# Patient Record
Sex: MALE | URBAN - METROPOLITAN AREA
[De-identification: names, ages, dates, MRNs, and addresses within clinical notes are randomized per-mention and may not be internally consistent; named-entity substitution may affect disease eponyms.]

---

## 2022-01-23 ENCOUNTER — HOSPITAL ENCOUNTER (EMERGENCY)
Age: 37
Discharge: LEFT BEFORE DISCHARGE | End: 2022-01-23
Admitting: EMERGENCY MEDICINE

## 2022-01-23 VITALS
BODY MASS INDEX: 29.55 KG/M2 | WEIGHT: 195 LBS | TEMPERATURE: 99.4 F | OXYGEN SATURATION: 100 % | DIASTOLIC BLOOD PRESSURE: 82 MMHG | SYSTOLIC BLOOD PRESSURE: 145 MMHG | HEIGHT: 68 IN | HEART RATE: 103 BPM | RESPIRATION RATE: 18 BRPM

## 2022-01-23 DIAGNOSIS — K02.9 PAIN DUE TO DENTAL CARIES: Primary | ICD-10-CM

## 2022-01-23 DIAGNOSIS — Z76.5 DRUG-SEEKING BEHAVIOR: ICD-10-CM

## 2022-01-23 PROCEDURE — 99282 EMERGENCY DEPT VISIT SF MDM: CPT

## 2022-01-23 NOTE — ED PROVIDER NOTES
EMERGENCY DEPARTMENT HISTORY AND PHYSICAL EXAM      Date: 1/23/2022  Patient Name: Mynor Reilly    History of Presenting Illness     Chief Complaint   Patient presents with    Dental Pain       History Provided By: Patient    HPI: Mynor Reilly, 29 y.o. male with no past medical history   who presents to the ED with cc of dental pain for 5 days. Pt states that he has been taking amoxil 500mg that his wife had and narco that he paid for from his friend. Pt states he would like to continue taking the amoxil for the infection to save money and wanted to know if I could prescribe norco for pain for him since the NSAIDS and tylenol are not working. I explained to the patient that we do not prescribe narcotics for tooth pain. Pt became very upset and stormed out of the ED. There are no other complaints, changes, or physical findings at this time. PCP: No primary care provider on file. No current facility-administered medications on file prior to encounter. No current outpatient medications on file prior to encounter. Past History     Past Medical History:  No past medical history on file. Past Surgical History:  No past surgical history on file. Family History:  No family history on file. Social History:  Social History     Tobacco Use    Smoking status: Not on file    Smokeless tobacco: Not on file   Substance Use Topics    Alcohol use: Not on file    Drug use: Not on file       Allergies:  No Known Allergies      Review of Systems     Review of Systems   Constitutional: Negative. HENT: Positive for dental problem. Eyes: Negative. Respiratory: Negative. Cardiovascular: Negative. Gastrointestinal: Negative. Endocrine: Negative. Genitourinary: Negative. Musculoskeletal: Negative. Skin: Negative. Allergic/Immunologic: Negative. Neurological: Negative. Hematological: Negative. Psychiatric/Behavioral: Negative.         Physical Exam     Physical Exam  Vitals and nursing note reviewed. Constitutional:       Appearance: Normal appearance. He is normal weight. HENT:      Head: Normocephalic and atraumatic. Left Ear: Tympanic membrane normal.      Mouth/Throat:      Mouth: Mucous membranes are moist.      Pharynx: Oropharynx is clear. Comments: + dental caries  Pulmonary:      Breath sounds: Normal breath sounds. Neurological:      General: No focal deficit present. Mental Status: He is alert and oriented to person, place, and time. Mental status is at baseline. Psychiatric:         Mood and Affect: Mood normal.         Behavior: Behavior normal.         Thought Content: Thought content normal.         Judgment: Judgment normal.         Lab and Diagnostic Study Results     Labs -   No results found for this or any previous visit (from the past 12 hour(s)). Radiologic Studies -   @lastxrresult@  CT Results  (Last 48 hours)    None        CXR Results  (Last 48 hours)    None            Medical Decision Making   - I am the first provider for this patient. - I reviewed the vital signs, available nursing notes, past medical history, past surgical history, family history and social history. - Initial assessment performed. The patients presenting problems have been discussed, and they are in agreement with the care plan formulated and outlined with them. I have encouraged them to ask questions as they arise throughout their visit. Vital Signs-Reviewed the patient's vital signs.   Patient Vitals for the past 12 hrs:   Temp Pulse Resp BP SpO2   01/23/22 1120 99.4 °F (37.4 °C) (!) 103 18 (!) 145/82 100 %       Records Reviewed: Nursing Notes    The patient presents  with a differential diagnosis of dental pain    ED Course:          Provider Notes (Medical Decision Making):     MDM  Number of Diagnoses or Management Options  Risk of Complications, Morbidity, and/or Mortality  Presenting problems: minimal  Management options: minimal    Patient Progress  Patient progress: stable         Procedures   Medical Decision Makingedical Decision Making  Performed by: Sherre Fothergill Page, PA  PROCEDURES:  Procedures       Disposition   Disposition: Condition stable    AMA    DISCHARGE PLAN:  1. There are no discharge medications for this patient. 2.   Follow-up Information     Follow up With Specialties Details Why [address] [place] [address] Dentistry Schedule an appointment as soon as possible for a visit in 1 day  [address] [place] [address]  [phone]        3. Return to ED if worse   4. There are no discharge medications for this patient. Diagnosis     Clinical Impression:   1. Pain due to dental caries    2. Drug-seeking behavior        Attestations:    Sherre Fothergill Page, PA    Please note that this dictation was completed with in[employer], the computer voice recognition software. Quite often unanticipated grammatical, syntax, homophones, and other interpretive errors are inadvertently transcribed by the computer software. Please disregard these errors. Please excuse any errors that have escaped final proofreading. Thank you.

## 2022-01-23 NOTE — Clinical Note
The patient has decided to leave against medical advice, because he was unable to get a rx for narcotics for his tooth pain. He has normal mental status and adequate capacity to make medical decisions. The patient refuses hospital admission and  wants to be discharged. The risks have been explained to the patient, including worsening illness, chronic pain, permanent disability, and death. The benefits of admission have also been explained, including the availability and proximity of n urses, physicians, monitoring, diagnostic testing, and treatment. The patient was able to understand and state the risks and benefits of hospital admission. This was witnessed by nurse  and me. He had the opportunity to ask questions about his  medical condition. The patient was treated to the extent that he would allow, and knows that he may return for care at any time.     Follow up has been discussed and arranged with Sterling Lazaro